# Patient Record
Sex: FEMALE | Race: WHITE | NOT HISPANIC OR LATINO | Employment: FULL TIME | URBAN - METROPOLITAN AREA
[De-identification: names, ages, dates, MRNs, and addresses within clinical notes are randomized per-mention and may not be internally consistent; named-entity substitution may affect disease eponyms.]

---

## 2020-07-11 ENCOUNTER — HOSPITAL ENCOUNTER (EMERGENCY)
Facility: HOSPITAL | Age: 45
Discharge: HOME | End: 2020-07-11
Admitting: EMERGENCY MEDICINE

## 2020-07-11 VITALS
WEIGHT: 131.84 LBS | TEMPERATURE: 98.4 F | HEIGHT: 68 IN | BODY MASS INDEX: 19.98 KG/M2 | OXYGEN SATURATION: 100 % | SYSTOLIC BLOOD PRESSURE: 142 MMHG | HEART RATE: 57 BPM | DIASTOLIC BLOOD PRESSURE: 86 MMHG | RESPIRATION RATE: 17 BRPM

## 2020-07-11 DIAGNOSIS — S61.219A LACERATION OF MULTIPLE SITES OF LEFT HAND AND FINGERS, INITIAL ENCOUNTER: Primary | ICD-10-CM

## 2020-07-11 DIAGNOSIS — S61.412A LACERATION OF MULTIPLE SITES OF LEFT HAND AND FINGERS, INITIAL ENCOUNTER: Primary | ICD-10-CM

## 2020-07-11 PROCEDURE — 90715 TDAP VACCINE 7 YRS/> IM: CPT | Performed by: NURSE PRACTITIONER

## 2020-07-11 PROCEDURE — 25010000002 TDAP 5-2.5-18.5 LF-MCG/0.5 SUSPENSION: Performed by: NURSE PRACTITIONER

## 2020-07-11 PROCEDURE — 90471 IMMUNIZATION ADMIN: CPT | Performed by: NURSE PRACTITIONER

## 2020-07-11 PROCEDURE — 99282 EMERGENCY DEPT VISIT SF MDM: CPT

## 2020-07-11 RX ORDER — CEPHALEXIN 500 MG/1
500 CAPSULE ORAL 2 TIMES DAILY
Qty: 14 CAPSULE | Refills: 0 | Status: SHIPPED | OUTPATIENT
Start: 2020-07-11 | End: 2020-07-18

## 2020-07-11 RX ADMIN — TETANUS TOXOID, REDUCED DIPHTHERIA TOXOID AND ACELLULAR PERTUSSIS VACCINE, ADSORBED 0.5 ML: 5; 2.5; 8; 8; 2.5 SUSPENSION INTRAMUSCULAR at 08:21

## 2020-07-11 NOTE — ED NOTES
Wound cleaned with 0.9% NS soaked sterile 4x4s, wrapped in sterile 2x2s and Yvonne wraps. Pt educated on dressing cleanliness.     Angel Shaw  07/11/20 2323

## 2020-07-11 NOTE — ED PROVIDER NOTES
Subjective   Patient is a 45-year-old white female comes in with complaints of was using a weed radha last night and cut her fingers on her left side this happened around 87 last night tetanus shot is not up-to-date has good sensation able to move everything dressed last night still oozing a little bit.  Patient is right-handed      History provided by:  Patient  Finger Laceration   Location:  Left index middle and fourth finger  Severity:  Mild  Onset quality:  Sudden  Duration:  13 hours  Timing:  Unable to specify  Progression:  Improving  Chronicity:  New  Associated symptoms: no abdominal pain, no chest pain, no congestion, no cough, no fatigue, no fever, no loss of consciousness, no nausea, no rash and no shortness of breath        Review of Systems   Constitutional: Negative for activity change, appetite change, fatigue and fever.   HENT: Negative for congestion and trouble swallowing.    Eyes: Negative for discharge and redness.   Respiratory: Negative for apnea, cough, chest tightness, shortness of breath and stridor.    Cardiovascular: Negative for chest pain, palpitations and leg swelling.   Gastrointestinal: Negative for abdominal distention, abdominal pain and nausea.   Musculoskeletal: Negative for back pain, joint swelling and neck pain.   Skin: Positive for wound. Negative for color change, pallor and rash.        Left 3 fingers   Neurological: Negative for dizziness, loss of consciousness and numbness.       No past medical history on file.    No Known Allergies    No past surgical history on file.    No family history on file.             Objective   Physical Exam   Constitutional: She is oriented to person, place, and time. She appears well-developed and well-nourished. No distress.   HENT:   Head: Normocephalic and atraumatic.   Eyes: Pupils are equal, round, and reactive to light. Conjunctivae and EOM are normal.   Neck: Normal range of motion. Neck supple.   Cardiovascular: Normal rate,  regular rhythm, normal heart sounds and intact distal pulses. Exam reveals no gallop and no friction rub.   No murmur heard.  Pulmonary/Chest: Effort normal and breath sounds normal. No respiratory distress. She has no wheezes. She has no rales. She exhibits no tenderness.   Abdominal: Soft. Bowel sounds are normal. She exhibits no distension. There is no tenderness.   Musculoskeletal: Normal range of motion.   Neurological: She is alert and oriented to person, place, and time.   Skin: Skin is warm and dry. Laceration noted. No rash noted. She is not diaphoretic.        Psychiatric: She has a normal mood and affect. Her behavior is normal. Judgment and thought content normal.   Nursing note and vitals reviewed.      Procedures           ED Course           No radiology results for the last day  Medications   Tdap (BOOSTRIX) injection 0.5 mL (0.5 mL Intramuscular Given 7/11/20 0821)     Labs Reviewed - No data to display  Due to patient is greater than 13 hours will just dress and clean wounds will put on antibiotic and give tetanus shot patient in agreement with plan                                MDM  Number of Diagnoses or Management Options  Laceration of multiple sites of left hand and fingers, initial encounter:   Diagnosis management comments: Disposition: Discharged.    Patient discharged in stable condition.    Reviewed implications of results, diagnosis, meds, responsibility to follow up, warning signs and symptoms of possible worsening, potential complications and reasons to return to ER    Patient/Family voiced understanding of above instructions.    Discussed plan for discharge, as there is no emergent indication for admission.  Pt/family is agreeable and understands need for follow up and repeat testing.  Pt is aware that discharge does not mean that nothing is wrong but it indicates no emergency is present and they must continue care with follow-up as given below or physician of their choice.      FOLLOW-UP  Jana Davila [id number] RADHA KIMBROUGH [address] [place] [address] [place] [id number] an appointment as soon as possible for a visit in 2 days  [facility] EMERGENCY [id number] [place] [address] [phone] symptoms worsen       Medication List      New Prescriptions    cephalexin 500 MG capsule  Commonly known as:  KEFLEX  Take 1 capsule by mouth 2 (Two) Times a Day for 7 days.              Final diagnoses:   Laceration of multiple sites of left hand and fingers, initial encounter            Yanni Huntley, APRN  07/11/20 8205

## 2024-08-27 ENCOUNTER — HOSPITAL ENCOUNTER (EMERGENCY)
Facility: HOSPITAL | Age: 49
Discharge: HOME | End: 2024-08-27
Payer: COMMERCIAL

## 2024-08-27 ENCOUNTER — APPOINTMENT (OUTPATIENT)
Dept: GENERAL RADIOLOGY | Facility: HOSPITAL | Age: 49
End: 2024-08-27
Payer: COMMERCIAL

## 2024-08-27 VITALS
BODY MASS INDEX: 20.05 KG/M2 | HEART RATE: 60 BPM | OXYGEN SATURATION: 96 % | TEMPERATURE: 98.1 F | RESPIRATION RATE: 17 BRPM | SYSTOLIC BLOOD PRESSURE: 126 MMHG | HEIGHT: 68 IN | DIASTOLIC BLOOD PRESSURE: 68 MMHG | WEIGHT: 132.28 LBS

## 2024-08-27 DIAGNOSIS — R07.89 LEFT-SIDED CHEST WALL PAIN: ICD-10-CM

## 2024-08-27 DIAGNOSIS — R07.9 CHEST PAIN, UNSPECIFIED TYPE: Primary | ICD-10-CM

## 2024-08-27 LAB
ALBUMIN SERPL-MCNC: 4.9 G/DL (ref 3.5–5.2)
ALBUMIN/GLOB SERPL: 1.9 G/DL
ALP SERPL-CCNC: 68 U/L (ref 39–117)
ALT SERPL W P-5'-P-CCNC: 40 U/L (ref 1–33)
ANION GAP SERPL CALCULATED.3IONS-SCNC: 11 MMOL/L (ref 5–15)
AST SERPL-CCNC: 32 U/L (ref 1–32)
BASOPHILS # BLD AUTO: 0.06 10*3/MM3 (ref 0–0.2)
BASOPHILS NFR BLD AUTO: 0.8 % (ref 0–1.5)
BILIRUB SERPL-MCNC: 0.2 MG/DL (ref 0–1.2)
BUN SERPL-MCNC: 20 MG/DL (ref 6–20)
BUN/CREAT SERPL: 33.9 (ref 7–25)
CALCIUM SPEC-SCNC: 9.8 MG/DL (ref 8.6–10.5)
CHLORIDE SERPL-SCNC: 102 MMOL/L (ref 98–107)
CO2 SERPL-SCNC: 25 MMOL/L (ref 22–29)
CREAT SERPL-MCNC: 0.59 MG/DL (ref 0.57–1)
D DIMER PPP FEU-MCNC: <0.19 MG/L (FEU) (ref 0–0.5)
DEPRECATED RDW RBC AUTO: 41 FL (ref 37–54)
EGFRCR SERPLBLD CKD-EPI 2021: 111.3 ML/MIN/1.73
EOSINOPHIL # BLD AUTO: 0.49 10*3/MM3 (ref 0–0.4)
EOSINOPHIL NFR BLD AUTO: 6.5 % (ref 0.3–6.2)
ERYTHROCYTE [DISTWIDTH] IN BLOOD BY AUTOMATED COUNT: 11.9 % (ref 12.3–15.4)
GLOBULIN UR ELPH-MCNC: 2.6 GM/DL
GLUCOSE SERPL-MCNC: 111 MG/DL (ref 65–99)
HCT VFR BLD AUTO: 39 % (ref 34–46.6)
HGB BLD-MCNC: 13.5 G/DL (ref 12–15.9)
HOLD SPECIMEN: NORMAL
HOLD SPECIMEN: NORMAL
IMM GRANULOCYTES # BLD AUTO: 0.02 10*3/MM3 (ref 0–0.05)
IMM GRANULOCYTES NFR BLD AUTO: 0.3 % (ref 0–0.5)
LYMPHOCYTES # BLD AUTO: 1.8 10*3/MM3 (ref 0.7–3.1)
LYMPHOCYTES NFR BLD AUTO: 23.7 % (ref 19.6–45.3)
MCH RBC QN AUTO: 32.9 PG (ref 26.6–33)
MCHC RBC AUTO-ENTMCNC: 34.6 G/DL (ref 31.5–35.7)
MCV RBC AUTO: 95.1 FL (ref 79–97)
MONOCYTES # BLD AUTO: 0.68 10*3/MM3 (ref 0.1–0.9)
MONOCYTES NFR BLD AUTO: 9 % (ref 5–12)
NEUTROPHILS NFR BLD AUTO: 4.53 10*3/MM3 (ref 1.7–7)
NEUTROPHILS NFR BLD AUTO: 59.7 % (ref 42.7–76)
NRBC BLD AUTO-RTO: 0 /100 WBC (ref 0–0.2)
NT-PROBNP SERPL-MCNC: <36 PG/ML (ref 0–450)
PLATELET # BLD AUTO: 226 10*3/MM3 (ref 140–450)
PMV BLD AUTO: 9.3 FL (ref 6–12)
POTASSIUM SERPL-SCNC: 4.1 MMOL/L (ref 3.5–5.2)
PROT SERPL-MCNC: 7.5 G/DL (ref 6–8.5)
RBC # BLD AUTO: 4.1 10*6/MM3 (ref 3.77–5.28)
SODIUM SERPL-SCNC: 138 MMOL/L (ref 136–145)
TROPONIN T SERPL HS-MCNC: <6 NG/L
WBC NRBC COR # BLD AUTO: 7.58 10*3/MM3 (ref 3.4–10.8)
WHOLE BLOOD HOLD COAG: NORMAL
WHOLE BLOOD HOLD SPECIMEN: NORMAL

## 2024-08-27 PROCEDURE — 85025 COMPLETE CBC W/AUTO DIFF WBC: CPT

## 2024-08-27 PROCEDURE — 63710000001 PREDNISONE PER 5 MG

## 2024-08-27 PROCEDURE — 93005 ELECTROCARDIOGRAM TRACING: CPT

## 2024-08-27 PROCEDURE — 80053 COMPREHEN METABOLIC PANEL: CPT

## 2024-08-27 PROCEDURE — 83880 ASSAY OF NATRIURETIC PEPTIDE: CPT

## 2024-08-27 PROCEDURE — 85379 FIBRIN DEGRADATION QUANT: CPT

## 2024-08-27 PROCEDURE — 25010000002 ONDANSETRON PER 1 MG

## 2024-08-27 PROCEDURE — 84484 ASSAY OF TROPONIN QUANT: CPT

## 2024-08-27 PROCEDURE — 96374 THER/PROPH/DIAG INJ IV PUSH: CPT

## 2024-08-27 PROCEDURE — 96375 TX/PRO/DX INJ NEW DRUG ADDON: CPT

## 2024-08-27 PROCEDURE — 71046 X-RAY EXAM CHEST 2 VIEWS: CPT

## 2024-08-27 PROCEDURE — 25010000002 MORPHINE PER 10 MG

## 2024-08-27 PROCEDURE — 99284 EMERGENCY DEPT VISIT MOD MDM: CPT

## 2024-08-27 PROCEDURE — 25010000002 KETOROLAC TROMETHAMINE PER 15 MG

## 2024-08-27 RX ORDER — METHOCARBAMOL 500 MG/1
500 TABLET, FILM COATED ORAL ONCE
Status: COMPLETED | OUTPATIENT
Start: 2024-08-27 | End: 2024-08-27

## 2024-08-27 RX ORDER — ONDANSETRON 2 MG/ML
4 INJECTION INTRAMUSCULAR; INTRAVENOUS ONCE
Status: COMPLETED | OUTPATIENT
Start: 2024-08-27 | End: 2024-08-27

## 2024-08-27 RX ORDER — DICLOFENAC SODIUM 75 MG/1
75 TABLET, DELAYED RELEASE ORAL 2 TIMES DAILY PRN
Qty: 10 TABLET | Refills: 0 | Status: SHIPPED | OUTPATIENT
Start: 2024-08-27 | End: 2024-09-01

## 2024-08-27 RX ORDER — SODIUM CHLORIDE 0.9 % (FLUSH) 0.9 %
10 SYRINGE (ML) INJECTION AS NEEDED
Status: DISCONTINUED | OUTPATIENT
Start: 2024-08-27 | End: 2024-08-27 | Stop reason: HOSPADM

## 2024-08-27 RX ORDER — PREDNISONE 20 MG/1
20 TABLET ORAL DAILY
Qty: 4 TABLET | Refills: 0 | Status: SHIPPED | OUTPATIENT
Start: 2024-08-27 | End: 2024-08-31

## 2024-08-27 RX ORDER — METHOCARBAMOL 500 MG/1
500 TABLET, FILM COATED ORAL 4 TIMES DAILY PRN
Qty: 15 TABLET | Refills: 0 | Status: SHIPPED | OUTPATIENT
Start: 2024-08-27 | End: 2024-09-01

## 2024-08-27 RX ORDER — KETOROLAC TROMETHAMINE 30 MG/ML
15 INJECTION, SOLUTION INTRAMUSCULAR; INTRAVENOUS ONCE
Status: COMPLETED | OUTPATIENT
Start: 2024-08-27 | End: 2024-08-27

## 2024-08-27 RX ADMIN — MORPHINE SULFATE 4 MG: 4 INJECTION, SOLUTION INTRAMUSCULAR; INTRAVENOUS at 14:35

## 2024-08-27 RX ADMIN — ONDANSETRON 4 MG: 2 INJECTION INTRAMUSCULAR; INTRAVENOUS at 14:34

## 2024-08-27 RX ADMIN — KETOROLAC TROMETHAMINE 15 MG: 30 INJECTION, SOLUTION INTRAMUSCULAR at 16:18

## 2024-08-27 RX ADMIN — PREDNISONE 60 MG: 50 TABLET ORAL at 16:17

## 2024-08-27 RX ADMIN — METHOCARBAMOL 500 MG: 500 TABLET ORAL at 16:17

## 2024-08-27 NOTE — ED PROVIDER NOTES
Subjective   History of Present Illness  49-year-old female reports a history of chronic bronchitis presents the ED with complaints of left lower anterior chest pain along with pinpoint scapular pain that started approximately 2 hours prior to arrival, states it hurts worse with deep breaths or moving a certain way.  Denies shortness of breath, fever, recent illness, productive cough, abdominal pain, vomiting, diarrhea, recent travel, trauma to the chest    PCP: Abraham        Review of Systems   Constitutional:  Negative for fever.   HENT:  Negative for congestion.    Respiratory:  Negative for shortness of breath.    Cardiovascular:  Positive for chest pain.   Gastrointestinal:  Negative for abdominal pain, diarrhea, nausea and vomiting.   Genitourinary:  Negative for dysuria.       No past medical history on file.    No Known Allergies    No past surgical history on file.    No family history on file.    Social History     Socioeconomic History    Marital status:            Objective   Physical Exam  Vitals reviewed.   HENT:      Head: Normocephalic and atraumatic.   Eyes:      Extraocular Movements: Extraocular movements intact.      Conjunctiva/sclera: Conjunctivae normal.   Cardiovascular:      Rate and Rhythm: Normal rate and regular rhythm.      Pulses: Normal pulses.      Heart sounds: Normal heart sounds.      Comments: There is palpable tenderness of the left lower chest along with the left scapular region  Pulmonary:      Effort: Pulmonary effort is normal.      Breath sounds: Normal breath sounds. No wheezing.   Abdominal:      General: Bowel sounds are normal.      Palpations: Abdomen is soft.      Tenderness: There is no abdominal tenderness.   Musculoskeletal:         General: Tenderness present. No deformity. Normal range of motion.   Skin:     General: Skin is warm and dry.      Findings: No bruising, erythema or rash.   Neurological:      Mental Status: She is alert and oriented to person,  "place, and time.   Psychiatric:         Mood and Affect: Mood normal.         Behavior: Behavior normal.         Thought Content: Thought content normal.         Judgment: Judgment normal.         Procedures    EKG independently interpreted by Dr. Abreu and reviewed by myself as sinus bradycardia at a rate of 56 with no previous to compare to           ED Course  ED Course as of 08/28/24 0018   Tue Aug 27, 2024   1702 On reassessment, patient states she feels significantly better at this time [KB]   1723 Inspect reviewed [KB]      ED Course User Index  [KB] Shelby Magdaleno, NIYA      /68 (BP Location: Left arm, Patient Position: Sitting)   Pulse 60   Temp 98.1 °F (36.7 °C) (Oral)   Resp 17   Ht 172.7 cm (68\")   Wt 60 kg (132 lb 4.4 oz)   SpO2 96%   BMI 20.11 kg/m²   Labs Reviewed   COMPREHENSIVE METABOLIC PANEL - Abnormal; Notable for the following components:       Result Value    Glucose 111 (*)     ALT (SGPT) 40 (*)     BUN/Creatinine Ratio 33.9 (*)     All other components within normal limits    Narrative:     GFR Normal >60  Chronic Kidney Disease <60  Kidney Failure <15     CBC WITH AUTO DIFFERENTIAL - Abnormal; Notable for the following components:    RDW 11.9 (*)     Eosinophil % 6.5 (*)     Eosinophils, Absolute 0.49 (*)     All other components within normal limits   BNP (IN-HOUSE) - Normal    Narrative:     This assay is used as an aid in the diagnosis of individuals suspected of having heart failure. It can be used as an aid in the diagnosis of acute decompensated heart failure (ADHF) in patients presenting with signs and symptoms of ADHF to the emergency department (ED). In addition, NT-proBNP of <300 pg/mL indicates ADHF is not likely.    Age Range Result Interpretation  NT-proBNP Concentration (pg/mL:      <50             Positive            >450                   Gray                 300-450                    Negative             <300    50-75           Positive            >900         " "         [place]                300-900                  Negative            <300      >75             Positive            >1800                  Gray                300-1800                  Negative            <300   SINGLE HS TROPONIN T - Normal    Narrative:     High Sensitive Troponin T Reference Range:  <14.0 ng/L- Negative Female for AMI  <22.0 ng/L- Negative Male for AMI  >=14 - Abnormal Female indicating possible myocardial injury.  >=22 - Abnormal Male indicating possible myocardial injury.   Clinicians would have to utilize clinical acumen, EKG, Troponin, and serial changes to determine if it is an Acute Myocardial Infarction or myocardial injury due to an underlying chronic condition.        D-DIMER, QUANTITATIVE - Normal    Narrative:     According to the assay 's published package insert, a normal (<0.50 mg/L (FEU)) D-dimer result in conjunction with a non-high clinical probability assessment, excludes deep vein thrombosis (DVT) and pulmonary embolism (PE) with high sensitivity.    D-dimer values increase with age and this can make VTE exclusion of an older population difficult. To address this, the [employer], based on best available evidence and recent guidelines, recommends that clinicians use age-adjusted D-dimer thresholds in patients greater than 50 years of age with: a) a low probability of PE who do not meet all Pulmonary Embolism Rule Out Criteria, or b) in those with intermediate probability of PE.   The formula for an age-adjusted D-dimer cut-off is \"age/100\".  For example, a 60 year old patient would have an age-adjusted cut-off of 0.60 mg/L (FEU) and an 80 year old 0.80 mg/L (FEU).   RAINBOW DRAW    Narrative:     The following orders were created for panel order [place] Draw.  Procedure                               Abnormality         Status                     ---------                               -----------         ------                     Green Top " (Gel)[[id number]]                                  Final result               Lavender Top[[id number]]                                     Final result               Gold Top - SST[[id number]]                                   Final result               Light Blue Top[[id number]]                                   Final result                 Please view results for these tests on the individual orders.   GREEN TOP   LAVENDER TOP   GOLD TOP - SST   LIGHT BLUE TOP   CBC AND DIFFERENTIAL    Narrative:     The following orders were created for panel order CBC & Differential.  Procedure                               Abnormality         Status                     ---------                               -----------         ------                     CBC Auto Differential[[id number]]        Abnormal            Final result                 Please view results for these tests on the individual orders.     Medications   morphine injection 4 mg (4 mg Intravenous Given 8/27/24 1435)   ondansetron (ZOFRAN) injection 4 mg (4 mg Intravenous Given 8/27/24 1434)   predniSONE (DELTASONE) tablet 60 mg (60 mg Oral Given 8/27/24 1617)   ketorolac (TORADOL) injection 15 mg (15 mg Intravenous Given 8/27/24 1618)   methocarbamol (ROBAXIN) tablet 500 mg (500 mg Oral Given 8/27/24 1617)     XR Chest 2 View    Result Date: 8/27/2024  Impression: No acute chest finding. Electronically Signed: Luiza Wilson MD  8/27/2024 2:17 PM EDT  Workstation ID: [id number]             HEART Score: 2                              Medical Decision Making  Patient was seen for the above complaints.  Her only reported past medical history is chronic bronchitis as she uses breathing treatments for this.  She was afebrile nontoxic-appearing on assessment.  IV was established and blood work was obtained to assess for electrolyte abnormalities, cardiac function, D-dimer, infection.  White blood cell count 7.58, hemoglobin 13.5, D-dimer negative, troponin negative, BNP  negative, electrolytes fairly within normal limits.  Considered CT PE however D-dimer negative.  Chest x-ray was obtained and independently interpreted by the radiologist as no acute chest finding.  Patient was initially given morphine and Zofran for the discomfort but on follow-up states that she is still having moderate pain especially with inspiration or certain movements.  Patient was then given Toradol Robaxin and prednisone for the discomfort and on reassessment states that she feels significantly better at this time.  Patient has a low heart score with a essentially negative workup, believe this to be chest wall pain due to the findings above.  Given a prescription for Voltaren, short prescription for prednisone and Robaxin for muscle relaxer.  Discussed signs and symptoms of when to return to the ER.  Advised prompt follow-up with primary care for recheck of this.  Patient and family at bedside verbalized understanding and are agreeable with plan of care at this time.    I discussed findings with patient who voices understanding of discharge instructions, signs and symptoms requiring return to ED; discharged improved and in stable condition with follow up for re-evaluation.  This document is intended for medical expert use only. Reading of this document by patients and/or patient's family without participating medical staff guidance may result in misinterpretation and unintended morbidity.  Any interpretation of such data is the responsibility of the patient and/or family member responsible for the patient in concert with their primary or specialist providers, not to be left for sources of online searches such as [place], [employer] or similar queries. Relying on these approaches to knowledge may result in misinterpretation, misguided goals of care and even death should patients or family members try recommendations outside of the realm of professional medical care in a supervised inpatient environment.      Problems Addressed:  Chest pain, unspecified type: complicated acute illness or injury  Left-sided chest wall pain: complicated acute illness or injury    Amount and/or Complexity of Data Reviewed  Labs: ordered. Decision-making details documented in ED Course.  Radiology: ordered and independent interpretation performed. Decision-making details documented in ED Course.  ECG/medicine tests: ordered and independent interpretation performed. Decision-making details documented in ED Course.    Risk  Prescription drug management.        Final diagnoses:   Chest pain, unspecified type   Left-sided chest wall pain       ED Disposition  ED Disposition       ED Disposition   Discharge    Condition   Stable    Comment   --               Maria Fernanda Rosario, APRN  [address] [facility] [place] [address]  [phone]    Schedule an appointment as soon as possible for a visit            Medication List        New Prescriptions      diclofenac 75 MG EC tablet  Commonly known as: VOLTAREN  Take 1 tablet by mouth 2 (Two) Times a Day As Needed (pain/discomfort) for up to 5 days.     methocarbamol 500 MG tablet  Commonly known as: ROBAXIN  Take 1 tablet by mouth 4 (Four) Times a Day As Needed for Muscle Spasms for up to 5 days.     predniSONE 20 MG tablet  Commonly known as: DELTASONE  Take 1 tablet by mouth Daily for 4 days.               Where to Get Your Medications        These medications were sent to [facility] - [place], [place] - [address] - [phone] [facility] [phone]   [address] [place] [address]      Phone: [phone]   diclofenac 75 MG EC tablet  methocarbamol 500 MG tablet  predniSONE 20 MG tablet            Shelby Magdaleno, APRN  08/28/24 0022

## 2024-08-27 NOTE — DISCHARGE INSTRUCTIONS
Take Voltaren as needed for discomfort, do not mix this with any other NSAIDs such as ibuprofen Motrin or Aleve.  Take Robaxin as needed for muscle relaxant.  Take steroids until gone.  Make sure you are staying hydrated    Follow-up with primary care for recheck, call in the morning to make a follow-up appointment    Return with any new or worsening symptoms

## 2024-08-28 LAB
QT INTERVAL: 426 MS
QTC INTERVAL: 412 MS